# Patient Record
Sex: FEMALE | Race: WHITE | NOT HISPANIC OR LATINO | Employment: OTHER | URBAN - METROPOLITAN AREA
[De-identification: names, ages, dates, MRNs, and addresses within clinical notes are randomized per-mention and may not be internally consistent; named-entity substitution may affect disease eponyms.]

---

## 2017-01-16 ENCOUNTER — DOCUMENTATION (OUTPATIENT)
Dept: RADIATION ONCOLOGY | Facility: HOSPITAL | Age: 82
End: 2017-01-16

## 2017-01-16 ENCOUNTER — TELEPHONE (OUTPATIENT)
Dept: MAMMOGRAPHY | Facility: CLINIC | Age: 82
End: 2017-01-16

## 2017-01-16 NOTE — TELEPHONE ENCOUNTER
Patient's daughter called to tell us she has pneumonia and can't make the appointment today. This is the second time in a week the patient was too sick to come in for her appointment. I offered her a Friday appointment, but she doesn't think she will be better by then. Told the daughter we could reschedule at their convenience but would like to wait until the pt is well enough to come in. She will call us back as soon as she can.     Etta Forrest RN

## 2017-02-08 ENCOUNTER — HOSPITAL ENCOUNTER (OUTPATIENT)
Dept: GENERAL RADIOLOGY | Facility: HOSPITAL | Age: 82
Discharge: HOME | End: 2017-02-08
Attending: INTERNAL MEDICINE | Admitting: INTERNAL MEDICINE

## 2017-02-08 DIAGNOSIS — J47.9 BRONCHIECTASIS WITHOUT ACUTE EXACERBATION (HCC): ICD-10-CM

## 2017-02-08 PROCEDURE — 71020 HC CHEST PA AND LATERAL: CPT

## 2017-02-15 ENCOUNTER — APPOINTMENT (OUTPATIENT)
Dept: LAB | Facility: HOSPITAL | Age: 82
End: 2017-02-15

## 2017-02-21 ENCOUNTER — OFFICE VISIT (OUTPATIENT)
Dept: MAMMOGRAPHY | Facility: CLINIC | Age: 82
End: 2017-02-21

## 2017-02-21 VITALS
DIASTOLIC BLOOD PRESSURE: 52 MMHG | WEIGHT: 100.4 LBS | TEMPERATURE: 97.5 F | OXYGEN SATURATION: 92 % | SYSTOLIC BLOOD PRESSURE: 102 MMHG | HEART RATE: 87 BPM | BODY MASS INDEX: 17.79 KG/M2

## 2017-02-21 DIAGNOSIS — C50.912 BREAST CANCER METASTASIZED TO AXILLARY LYMPH NODE, LEFT (HCC): Primary | ICD-10-CM

## 2017-02-21 DIAGNOSIS — C77.3 BREAST CANCER METASTASIZED TO AXILLARY LYMPH NODE, LEFT (HCC): Primary | ICD-10-CM

## 2017-02-21 PROCEDURE — 99204 OFFICE O/P NEW MOD 45 MIN: CPT | Performed by: SURGERY

## 2017-02-21 NOTE — PROGRESS NOTES
Chief Complaint: Ana France is a 85 y.o.. female here today for Breast Cancer (left)        History of Present Illness:  Patient presents with A left axillary mass which represents breast cancer metastatic to a lymph node..  In November 2015 the patient had a CT scan of the chest that revealed a left axillary mass measuring 3.7 x 2.7 cm.  There was also the suggestion of left supraclavicular lymphadenopathy.  A biopsy of the left axillary mass revealed metastatic cancer consistent with breast origin.  It was ER positive at 5-10%, OK negative, and HER-2 positive.  A PET CT was performed in March 2016 and it revealed the left axillary mass to have increased in size to 4.6 cm.  There were also additional small metabolically active lymph nodes in the axilla, the left hilum, and the subcarinal level.  There was also an indeterminate focus at the left lung apex.  Treatment with an aromatase inhibitor and Herceptin was recommended that the family opted to do nothing.  In July 2016 they return to the oncologist because the area of concern had significantly enlarged.  Again they declined systemic therapy but opted for radiation.  This was performed and there was fairly significant shrinkage of the tumor.  A CT scan of the chest abdomen and pelvis at this time showed fairly stable disease.  Unfortunately the tumor has begun to grow again and she is sent here today to talk about the possibility of surgical resection.  There has been no arm swelling.  The tumor is uncomfortable to the patient but there is no bleeding or odor.      Review of Systems:  Review of Systems   All other systems reviewed and are negative.       Past Medical and Surgical History:  Breast Biopsy History:  Patient has had the following breast biopsies:Left Breast 2000- Malignant  Breast Cancer HIstory:  Patient has the following past medical history of breast cancer treatment:Breast cancer involving a large left axillary mass, ER low 5-10%, OK  negative, HER2 3+. Had lumpectomy with radiation in 2000 and more radiation on Axilla mass 2016  Breast Operations, and year:  Left Lumpectomy 2000 in [facility]     History   Smoking Status   • Never Smoker   Smokeless Tobacco   • Never Used     Obstetric History:  Patient is postmenopausal, entered menopause naturally at age: 44   Number of pregnancies:3  Number of live births: 3  Number of abortions or miscarriages: 0  Age of delivery of first child: 30  Patient breast fed, for the following lenth of time: approx 18 months  Length of time taking birth control pills:12 years  Patient has never taken hormone replacement    Past Surgical History   Procedure Laterality Date   • Breast lumpectomy Right 2000       Past Medical History   Diagnosis Date   • Abdominal pain    • Annual physical exam    • Aortic valve calcification    • Axillary adenopathy    • Benign paroxysmal positional vertigo    • Breast CA, right    • Bronchiectasis without acute exacerbation    • Cancer    • Chronic bronchitis    • COPD with acute exacerbation    • Depression    • Diarrhea    • Dysphagia, oropharyngeal    • Fatigue    • Nasal congestion    • Nausea    • Pleural effusion    • Pneumonia    • Preventive measure    • Systolic murmur    • Urinary frequency    • Weight loss        Prior Hospitalizations, other than for surgery or childbirth, and year:  Pneumonia 2016    Social History:  Patient is .  Patient has 3 daughters.    Family History:  Family History   Problem Relation Age of Onset   • Breast cancer Mother    • Hearing loss Mother    • Coronary artery disease Father    • Brain cancer Brother    • Melanoma Daughter        Vital Signs:  Vitals:    02/21/17 1048   BP: 102/52   Pulse: 87   Temp: 97.5 °F (36.4 °C)   SpO2: 92%       Medications:    Current Outpatient Prescriptions:   •  Prenatal Vit-Min-FA-Fish Oil ([employer] PRENATAL GUMMY PO), Take 2 tablets by mouth daily., Disp: , Rfl:      Physical Examination:  General  Appearance:   Patient is in no distress.  She is well kept and has an thin build.   Psychiatric:  Patient with appropriate mood and affect. Alert and oriented to self, time, and place.    Breast, RIGHT:  small sized, symmetric with the contralateral side.  Breast skin is without erythema, edema, rashes.  There are no visible abnormalities upon inspection during the arm-raising maneuver or with hands on hips in the sitting position. There is no nipple retraction, discharge or nipple/areolar skin changes.There are no masses palpable in the sitting or supine positions.    Breast, LEFT:  small sized, symmetric with the contralateral side.  Breast skin is without erythema, edema, rashes.  There are no visible abnormalities upon inspection during the arm-raising maneuver or with hands on hips in the sitting position. There is no nipple retraction, discharge or nipple/areolar skin changes.There are no masses palpable in the sitting or supine positions.  There is a 3 cm axillary mass that is protruding from the skin but also has a deeper component that does not seem necessarily fixed to the deeper tissues but is rather high in the axilla.  Lymphatic:  There is no axillary, cervical, infraclavicular, or supraclavicular adenopathy bilaterally.  Eyes:  Pupils are round and reactive to light.  Cardiovascular:  Heart rate and rhythm are regular.  Respiratory:  Lungs are clear bilaterally with no crackles or wheezes in any lung field.  Gastrointestinal:  Abdomen is soft, nondistended, and nontender.  There is no evidence of hepatosplenomegaly There are no scars from previous surgery.    Musculoskeletal:  Good strength in all 4 extremities.   There is good range of motion in both shoulders.    Skin:  No new skin lesions or rashes on the skin excluding the breast (see breast exam above).    Assessment:  Diagnoses and all orders for this visit:    Breast cancer metastasized to axillary lymph node, left      Plan:  I am concerned  about the resectability of this area.  It would appear to be rather close to the axillary artery and vein.  Radiation also causes significant fibrosis and may make identification of these important structures difficult at the time of surgery.  I told the patient and her daughter that unless we felt that we could completely excise this area it would not make sense to operate.  I would like to review her scans with the radiologist but it may involve repeating either a PET scan or CT to determine where the axillary vessels are in relationship to this mass.  I will also be speaking with Dr. Latham to see if perhaps we could consider anti-estrogen therapy at this point in time.      CPT coding:    Next Appointment:  No Follow-up on file.

## 2017-02-23 ENCOUNTER — TELEPHONE (OUTPATIENT)
Dept: MAMMOGRAPHY | Facility: CLINIC | Age: 82
End: 2017-02-23

## 2017-02-23 NOTE — TELEPHONE ENCOUNTER
I told her daughters that upon review of the x-rays the tumor appears to be very close to the axillary vessels and I would be uncomfortable operating in that area especially after radiation.  I have spoken with Dr. Latham and relayed this information to him.  The patient will return to Dr. Latham to discuss further options.

## 2017-03-02 ENCOUNTER — TELEPHONE (OUTPATIENT)
Dept: ONCOLOGY | Facility: HOSPITAL | Age: 82
End: 2017-03-02

## 2017-03-02 ENCOUNTER — APPOINTMENT (OUTPATIENT)
Dept: LAB | Facility: HOSPITAL | Age: 82
End: 2017-03-02

## 2017-03-02 ENCOUNTER — OFFICE VISIT (OUTPATIENT)
Dept: ONCOLOGY | Facility: CLINIC | Age: 82
End: 2017-03-02

## 2017-03-02 VITALS
DIASTOLIC BLOOD PRESSURE: 78 MMHG | TEMPERATURE: 97.8 F | HEART RATE: 113 BPM | RESPIRATION RATE: 14 BRPM | SYSTOLIC BLOOD PRESSURE: 122 MMHG | BODY MASS INDEX: 17.61 KG/M2 | WEIGHT: 99.4 LBS | OXYGEN SATURATION: 95 % | HEIGHT: 63 IN

## 2017-03-02 DIAGNOSIS — C77.3 BREAST CANCER METASTASIZED TO AXILLARY LYMPH NODE, LEFT (HCC): Primary | ICD-10-CM

## 2017-03-02 DIAGNOSIS — C50.912 BREAST CANCER METASTASIZED TO AXILLARY LYMPH NODE, LEFT (HCC): Primary | ICD-10-CM

## 2017-03-02 DIAGNOSIS — C50.312 CANCER OF LOWER-INNER QUADRANT OF LEFT FEMALE BREAST (HCC): ICD-10-CM

## 2017-03-02 DIAGNOSIS — Z79.899 DRUG THERAPY: ICD-10-CM

## 2017-03-02 PROCEDURE — G0463 HOSPITAL OUTPT CLINIC VISIT: HCPCS | Performed by: INTERNAL MEDICINE

## 2017-03-02 PROCEDURE — 99214 OFFICE O/P EST MOD 30 MIN: CPT | Performed by: INTERNAL MEDICINE

## 2017-03-02 NOTE — PROGRESS NOTES
REASONS FOR FOLLOW-UP:  Breast cancer involving a large left axillary mass, ER low 5-10%, ME negative, HER2 3+; PET scan shows probable involvement of a right supraclavicular, left hilar, and subcarinal lymph nodes.  She is s/p palliative radiation to the left axillary mass to 4000 cGY completed 8/11/2016.    HISTORY OF PRESENT ILLNESS:     History of Present Illness   This 84-year-old patient was seen initially 3/15/16 at the request of her primary care physician, Dr. Barksdale. She noted a left axillary lymph node mass approximately 6 months before her consultation here and over the past 6 months, the mass has been slowly enlarging currently to the size of a baseball. She has noted no other lymphadenopathy or masses no changes on her self left breast exam. The mass is mildly tender but not overtly painful. She complains of some mild fatigue and a 10 pound weight loss over the past one year. She has chronic dyspnea related to bronchiectasis, however denies any recent worsening of her respiratory status.       She underwent a CT scan of the chest at [facility] on 11/6/15 which showed a mass in the left axilla measuring 3.7 x 2.77 m with central necrosis. There were 2 smaller nodes on a prior imaging study of 2014 measuring 1.8 cm. There is suggestion of left supraclavicular lymph node measuring 1.4 severe. No axillary mass or right axillary lymphadenopathy noted. There are stable shotty lymph nodes in the mediastinum and the lungs have extensive bronchiectasis within the right middle lobe and lingula with chronic inflammatory changes. She had an ultrasound guided biopsy of the left axillary mass performed on 11/23/15 positive for metastatic carcinoma consistent with breast origin. The tumor was ER low +5-10% of cells, ME completely negative, and HER-2/[place] 3+ by immunohistochemistry    A PET scan 3/23/16 showed significant metabolic activity in the left axillary mass and also the left hilum, subcarinal, and  right supraclavicular lymph nodes.  I recommended treatment with Herceptin combined with anti-estrogen therapy as anti-estrogen therapy alone would be expected to have low activity since ER was only lowly positive.  After much discussion, the patient and her family decided that they did not want any active therapy at that time and chose palliative maneuvers.    The patient returned early July 2016 with 2 daughters complaining of the enlarging left axillary mass which is beginning to become painful and developing some bluish discoloration of the skin.  She missed her appointment in April.  She has chronic dyspnea but no cough.  She denies pain elsewhere, weight loss, or other concerns.  They again declined systemic therapy for breast cancer including Herceptin.  She was referred to radiation oncology for consideration of palliative radiation to the enlarging left axillary mass which was completed to 4000 cGy 8/11/16.     She returned 12/21/16 accompanied by 2 daughters for review.  She continues to do quite well with no weight loss, no significant pain or other disease related issues other than the fact that the left axillary mass has again begun to enlarge modestly.  This is not causing her pain and there is no open wound or drainage.  We had her reviewed by Dr. Gutierrez to see if the axillary mass could be potentially resected but he felt it was too near the axillary vessels and nerves to be safely excised.      She returns today continuing to do well with slow enlargement of the left axillary mass but no significant pain, discomfort, weight loss etc.  She has chronic shortness of breath which is stable        Past Medical History, Past Surgical History, Social History, Family History have been reviewed and are without significant changes except as mentioned in my consult 3/15/16.    Review of Systems   Constitutional: Positive for fatigue. Negative for activity change and unexpected weight change.   Respiratory:  "Positive for shortness of breath. Negative for cough and wheezing.    Cardiovascular: Negative for chest pain and palpitations.   Gastrointestinal: Positive for diarrhea. Negative for abdominal distention, abdominal pain, constipation, nausea and vomiting.   Genitourinary: Negative for hematuria.   Musculoskeletal: Negative for arthralgias and back pain.   Skin: Negative for pallor.   Neurological: Negative for weakness, light-headedness, numbness and headaches.   Hematological: Positive for adenopathy.   Psychiatric/Behavioral:        Poor memory      A comprehensive 14 point review of systems was performed and was negative except as mentioned.  Complains of mild cough and poor memory.    Medications:  The current medication list was reviewed in the EMR    ALLERGIES:  No Known Allergies    Objective      Vitals:    03/02/17 1256   BP: 122/78   Pulse: 113   Resp: 14   Temp: 97.8 °F (36.6 °C)   TempSrc: Oral   SpO2: 95%   Weight: 99 lb 6.4 oz (45.1 kg)   Height: 62.99\" (160 cm)   PainSc: 0-No pain     Current Status 3/2/2017   ECOG score 0       Physical Exam     GEN:  Thin elderly woman, NAD, pursed-lip breathing   RESP:  Diminished BS, no wheezing  NECK: Supple with good range of motion; no thyromegaly or masses, no JVD.   LYMPHATICS: There continues to be a 3-4 cm left axillary mass protruding with more fullness in the soft tissue deep to the mass than previous    RECENT LABS:  Hematology WBC   Date Value Ref Range Status   12/21/2016 5.82 4.00 - 10.00 10*3/mm3 Final   08/29/2015 7.33 4.50 - 10.70 K/Cumm Final     RBC   Date Value Ref Range Status   12/21/2016 4.52 3.90 - 5.00 10*6/mm3 Final   08/29/2015 4.67 3.90 - 5.20 Million Final     HEMOGLOBIN   Date Value Ref Range Status   12/21/2016 14.0 11.5 - 14.9 g/dL Final   08/29/2015 13.8 11.9 - 15.5 g/dL Final     HEMATOCRIT   Date Value Ref Range Status   12/21/2016 40.0 34.0 - 45.0 % Final   08/29/2015 40.3 35.6 - 45.5 % Final     PLATELETS   Date Value Ref Range " Status   12/21/2016 214 150 - 375 10*3/mm3 Final   08/29/2015 190 140 - 500 K/Cumm Final        Lab Results   Component Value Date    GLUCOSE 140 (H) 07/21/2016    BUN 11 07/21/2016    CREATININE 0.83 07/21/2016    EGFRIFNONA 65 07/21/2016    BCR 13.3 07/21/2016    CO2 29.2 (H) 07/21/2016    CALCIUM 9.0 07/21/2016    ALBUMIN 3.90 07/21/2016    LABIL2 1.4 07/21/2016    AST 14 07/21/2016    ALT 9 07/21/2016     Ca 15-3 19.2        Assessment/Plan   This very pleasant 84-year-old woman returns for follow-up of her breast cancer involving a  left axillary mass with PET scan showing probable metastatic disease in addition to a right supraclavicular lymph node, left hilar lymph node, and a subcarinal lymph node.  Her disease is estrogen receptor positive but to a low degree and only 5-10% of cells and HER-2/[place] overexpressed.    She is status post radiation therapy to the large axillary mass 4000 cGy completed 8/11/16.      Despite initial response in the left axillary mass, the mass has again begun to increase in size over the past 2-3 months, albeit at a slow rate.  She saw Dr. Gutierrez who did not feel the mass was resectable as it is close to the axillary vessels and nerves.  Dr. Garnica indicated further radiation could be given, but she had a fairly short benefit after the last round of radiation.    I again discussed with the patient and daughters potential of systemic therapy with Herceptin given that the tumor had HER-2/[place] overexpression.  At this time they would like to pursue treatment with Herceptin every 3 weeks.  I discussed the possible side effects of infusion reactions, cardiomyopathy etc.  She was agreeable to proceed understanding treatment is palliative.  We will arrange a follow-up baseline PET scan, 2-D echocardiogram and submit Herceptin for prior authorization.  I will see the patient back in 2 weeks to review scans and potentially proceed with the first dose of Herceptin.  We could consider  adding an aromatase inhibitor but she has fairly low estrogen receptor so degree of benefit would likely be small.               3/2/2017      CC:

## 2017-03-02 NOTE — TELEPHONE ENCOUNTER
Called pt's daughter at the request of Dr. Latham.  Left message stating that the Herceptin met the guidelines for coverage that Medicare follows.  I left my direct line if she has questions.

## 2017-03-09 ENCOUNTER — HOSPITAL ENCOUNTER (OUTPATIENT)
Dept: CARDIOLOGY | Facility: HOSPITAL | Age: 82
Discharge: HOME | End: 2017-03-09
Attending: INTERNAL MEDICINE

## 2017-03-09 ENCOUNTER — HOSPITAL ENCOUNTER (OUTPATIENT)
Dept: PET IMAGING | Facility: HOSPITAL | Age: 82
Discharge: HOME | End: 2017-03-09
Attending: INTERNAL MEDICINE

## 2017-03-09 ENCOUNTER — HOSPITAL ENCOUNTER (OUTPATIENT)
Dept: PET IMAGING | Facility: HOSPITAL | Age: 82
Discharge: HOME | End: 2017-03-09
Attending: INTERNAL MEDICINE | Admitting: INTERNAL MEDICINE

## 2017-03-09 VITALS
HEART RATE: 105 BPM | SYSTOLIC BLOOD PRESSURE: 136 MMHG | BODY MASS INDEX: 17.54 KG/M2 | HEIGHT: 63 IN | DIASTOLIC BLOOD PRESSURE: 90 MMHG | WEIGHT: 99 LBS

## 2017-03-09 DIAGNOSIS — C50.312 CANCER OF LOWER-INNER QUADRANT OF LEFT FEMALE BREAST (HCC): ICD-10-CM

## 2017-03-09 DIAGNOSIS — C77.3 BREAST CANCER METASTASIZED TO AXILLARY LYMPH NODE, LEFT (HCC): ICD-10-CM

## 2017-03-09 DIAGNOSIS — Z79.899 DRUG THERAPY: ICD-10-CM

## 2017-03-09 DIAGNOSIS — C50.912 BREAST CANCER METASTASIZED TO AXILLARY LYMPH NODE, LEFT (HCC): ICD-10-CM

## 2017-03-09 LAB
ASCENDING AORTA: 3.4 CM
BH CV ECHO MEAS - ACS: 1.5 CM
BH CV ECHO MEAS - AO ROOT AREA (BSA CORRECTED): 1.8
BH CV ECHO MEAS - AO ROOT AREA: 5.2 CM^2
BH CV ECHO MEAS - AO ROOT DIAM: 2.6 CM
BH CV ECHO MEAS - BSA(HAYCOCK): 1.4 M^2
BH CV ECHO MEAS - BSA: 1.4 M^2
BH CV ECHO MEAS - BZI_BMI: 17.7 KILOGRAMS/M^2
BH CV ECHO MEAS - BZI_METRIC_HEIGHT: 160 CM
BH CV ECHO MEAS - BZI_METRIC_WEIGHT: 45.4 KG
BH CV ECHO MEAS - EDV(CUBED): 26.5 ML
BH CV ECHO MEAS - EDV(TEICH): 34.5 ML
BH CV ECHO MEAS - EF(CUBED): 85.5 %
BH CV ECHO MEAS - EF(TEICH): 80.3 %
BH CV ECHO MEAS - ESV(CUBED): 3.8 ML
BH CV ECHO MEAS - ESV(TEICH): 6.8 ML
BH CV ECHO MEAS - FS: 47.4 %
BH CV ECHO MEAS - IVS/LVPW: 1
BH CV ECHO MEAS - IVSD: 0.98 CM
BH CV ECHO MEAS - LV MASS(C)D: 77.4 GRAMS
BH CV ECHO MEAS - LV MASS(C)DI: 53.8 GRAMS/M^2
BH CV ECHO MEAS - LVIDD: 3 CM
BH CV ECHO MEAS - LVIDS: 1.6 CM
BH CV ECHO MEAS - LVOT AREA (M): 2.5 CM^2
BH CV ECHO MEAS - LVOT AREA: 2.5 CM^2
BH CV ECHO MEAS - LVOT DIAM: 1.8 CM
BH CV ECHO MEAS - LVPWD: 0.95 CM
BH CV ECHO MEAS - MV A DUR: 0.11 SEC
BH CV ECHO MEAS - MV A MAX VEL: 84.9 CM/SEC
BH CV ECHO MEAS - MV DEC SLOPE: 295 CM/SEC^2
BH CV ECHO MEAS - MV DEC TIME: 0.19 SEC
BH CV ECHO MEAS - MV E MAX VEL: 56.8 CM/SEC
BH CV ECHO MEAS - MV E/A: 0.67
BH CV ECHO MEAS - MV MAX PG: 3.6 MMHG
BH CV ECHO MEAS - MV MEAN PG: 2 MMHG
BH CV ECHO MEAS - MV P1/2T MAX VEL: 59.7 CM/SEC
BH CV ECHO MEAS - MV P1/2T: 59.3 MSEC
BH CV ECHO MEAS - MV V2 MAX: 95 CM/SEC
BH CV ECHO MEAS - MV V2 MEAN: 67.8 CM/SEC
BH CV ECHO MEAS - MV V2 VTI: 16 CM
BH CV ECHO MEAS - MVA P1/2T LCG: 3.7 CM^2
BH CV ECHO MEAS - MVA(P1/2T): 3.7 CM^2
BH CV ECHO MEAS - PA MAX PG (FULL): 2.1 MMHG
BH CV ECHO MEAS - PA MAX PG: 3.7 MMHG
BH CV ECHO MEAS - PA V2 MAX: 96 CM/SEC
BH CV ECHO MEAS - PVA(V,A): 1.6 CM^2
BH CV ECHO MEAS - PVA(V,D): 1.6 CM^2
BH CV ECHO MEAS - RV MAX PG: 1.6 MMHG
BH CV ECHO MEAS - RV MEAN PG: 0.61 MMHG
BH CV ECHO MEAS - RV V1 MAX: 63.5 CM/SEC
BH CV ECHO MEAS - RV V1 MEAN: 33.8 CM/SEC
BH CV ECHO MEAS - RV V1 VTI: 7.3 CM
BH CV ECHO MEAS - RVOT AREA: 2.5 CM^2
BH CV ECHO MEAS - RVOT DIAM: 1.8 CM
BH CV ECHO MEAS - SI(CUBED): 15.7 ML/M^2
BH CV ECHO MEAS - SI(TEICH): 19.2 ML/M^2
BH CV ECHO MEAS - SUP REN AO DIAM: 1.5 CM
BH CV ECHO MEAS - SV(CUBED): 22.6 ML
BH CV ECHO MEAS - SV(RVOT): 17.9 ML
BH CV ECHO MEAS - SV(TEICH): 27.7 ML
SINUS: 2.9 CM
STJ: 3.1 CM

## 2017-03-09 PROCEDURE — 0 FLUDEOXYGLUCOSE F18 SOLUTION: Performed by: INTERNAL MEDICINE

## 2017-03-09 PROCEDURE — 93306 TTE W/DOPPLER COMPLETE: CPT

## 2017-03-09 PROCEDURE — A9552 F18 FDG: HCPCS | Performed by: INTERNAL MEDICINE

## 2017-03-09 PROCEDURE — 78815 PET IMAGE W/CT SKULL-THIGH: CPT

## 2017-03-09 PROCEDURE — 93306 TTE W/DOPPLER COMPLETE: CPT | Performed by: INTERNAL MEDICINE

## 2017-03-09 RX ADMIN — FLUDEOXYGLUCOSE F18 1 DOSE: 300 INJECTION INTRAVENOUS at 11:47

## 2017-03-17 ENCOUNTER — INFUSION (OUTPATIENT)
Dept: ONCOLOGY | Facility: HOSPITAL | Age: 82
End: 2017-03-17

## 2017-03-17 ENCOUNTER — LAB (OUTPATIENT)
Dept: LAB | Facility: HOSPITAL | Age: 82
End: 2017-03-17

## 2017-03-17 ENCOUNTER — OFFICE VISIT (OUTPATIENT)
Dept: ONCOLOGY | Facility: CLINIC | Age: 82
End: 2017-03-17

## 2017-03-17 VITALS
BODY MASS INDEX: 17.82 KG/M2 | DIASTOLIC BLOOD PRESSURE: 72 MMHG | WEIGHT: 100.6 LBS | RESPIRATION RATE: 16 BRPM | OXYGEN SATURATION: 92 % | HEART RATE: 106 BPM | TEMPERATURE: 98 F | HEIGHT: 63 IN | SYSTOLIC BLOOD PRESSURE: 120 MMHG

## 2017-03-17 VITALS — HEART RATE: 88 BPM | DIASTOLIC BLOOD PRESSURE: 80 MMHG | SYSTOLIC BLOOD PRESSURE: 147 MMHG

## 2017-03-17 DIAGNOSIS — C77.3 BREAST CANCER METASTASIZED TO AXILLARY LYMPH NODE, LEFT (HCC): ICD-10-CM

## 2017-03-17 DIAGNOSIS — C50.912 BREAST CANCER METASTASIZED TO AXILLARY LYMPH NODE, LEFT (HCC): Primary | ICD-10-CM

## 2017-03-17 DIAGNOSIS — C50.919 BREAST CANCER METASTASIZED TO AXILLARY LYMPH NODE, UNSPECIFIED LATERALITY (HCC): Primary | ICD-10-CM

## 2017-03-17 DIAGNOSIS — C77.3 BREAST CANCER METASTASIZED TO AXILLARY LYMPH NODE, UNSPECIFIED LATERALITY (HCC): Primary | ICD-10-CM

## 2017-03-17 DIAGNOSIS — C50.912 BREAST CANCER METASTASIZED TO AXILLARY LYMPH NODE, LEFT (HCC): ICD-10-CM

## 2017-03-17 DIAGNOSIS — C50.312 CANCER OF LOWER-INNER QUADRANT OF LEFT FEMALE BREAST (HCC): ICD-10-CM

## 2017-03-17 DIAGNOSIS — C77.3 BREAST CANCER METASTASIZED TO AXILLARY LYMPH NODE, LEFT (HCC): Primary | ICD-10-CM

## 2017-03-17 LAB
ALBUMIN SERPL-MCNC: 4 G/DL (ref 3.5–5.2)
ALBUMIN/GLOB SERPL: 1.3 G/DL (ref 1.1–2.4)
ALP SERPL-CCNC: 76 U/L (ref 38–116)
ALT SERPL W P-5'-P-CCNC: 11 U/L (ref 0–33)
ANION GAP SERPL CALCULATED.3IONS-SCNC: 10.7 MMOL/L
AST SERPL-CCNC: 16 U/L (ref 0–32)
BASOPHILS # BLD AUTO: 0.04 10*3/MM3 (ref 0–0.1)
BASOPHILS NFR BLD AUTO: 0.5 % (ref 0–1.1)
BILIRUB SERPL-MCNC: 0.8 MG/DL (ref 0.1–1.2)
BUN BLD-MCNC: 9 MG/DL (ref 6–20)
BUN/CREAT SERPL: 13.6 (ref 7.3–30)
CALCIUM SPEC-SCNC: 9.1 MG/DL (ref 8.5–10.2)
CHLORIDE SERPL-SCNC: 91 MMOL/L (ref 98–107)
CO2 SERPL-SCNC: 29.3 MMOL/L (ref 22–29)
CREAT BLD-MCNC: 0.66 MG/DL (ref 0.6–1.1)
DEPRECATED RDW RBC AUTO: 47.9 FL (ref 37–49)
EOSINOPHIL # BLD AUTO: 0.07 10*3/MM3 (ref 0–0.36)
EOSINOPHIL NFR BLD AUTO: 0.9 % (ref 1–5)
ERYTHROCYTE [DISTWIDTH] IN BLOOD BY AUTOMATED COUNT: 14.5 % (ref 11.7–14.5)
GFR SERPL CREATININE-BSD FRML MDRD: 85 ML/MIN/1.73
GLOBULIN UR ELPH-MCNC: 3 GM/DL (ref 1.8–3.5)
GLUCOSE BLD-MCNC: 196 MG/DL (ref 74–124)
HCT VFR BLD AUTO: 42.7 % (ref 34–45)
HGB BLD-MCNC: 14.1 G/DL (ref 11.5–14.9)
HOLD SPECIMEN: NORMAL
IMM GRANULOCYTES # BLD: 0.03 10*3/MM3 (ref 0–0.03)
IMM GRANULOCYTES NFR BLD: 0.4 % (ref 0–0.5)
LYMPHOCYTES # BLD AUTO: 0.87 10*3/MM3 (ref 1–3.5)
LYMPHOCYTES NFR BLD AUTO: 11.5 % (ref 20–49)
MCH RBC QN AUTO: 29.9 PG (ref 27–33)
MCHC RBC AUTO-ENTMCNC: 33 G/DL (ref 32–35)
MCV RBC AUTO: 90.7 FL (ref 83–97)
MONOCYTES # BLD AUTO: 0.35 10*3/MM3 (ref 0.25–0.8)
MONOCYTES NFR BLD AUTO: 4.6 % (ref 4–12)
NEUTROPHILS # BLD AUTO: 6.19 10*3/MM3 (ref 1.5–7)
NEUTROPHILS NFR BLD AUTO: 82.1 % (ref 39–75)
NRBC BLD MANUAL-RTO: 0 /100 WBC (ref 0–0)
PLATELET # BLD AUTO: 271 10*3/MM3 (ref 150–375)
PMV BLD AUTO: 9.2 FL (ref 8.9–12.1)
POTASSIUM BLD-SCNC: 4.5 MMOL/L (ref 3.5–4.7)
PROT SERPL-MCNC: 7 G/DL (ref 6.3–8)
RBC # BLD AUTO: 4.71 10*6/MM3 (ref 3.9–5)
SODIUM BLD-SCNC: 131 MMOL/L (ref 134–145)
WBC NRBC COR # BLD: 7.55 10*3/MM3 (ref 4–10)

## 2017-03-17 PROCEDURE — 96375 TX/PRO/DX INJ NEW DRUG ADDON: CPT | Performed by: INTERNAL MEDICINE

## 2017-03-17 PROCEDURE — 85025 COMPLETE CBC W/AUTO DIFF WBC: CPT | Performed by: INTERNAL MEDICINE

## 2017-03-17 PROCEDURE — 25010000002 TRASTUZUMAB PER 10 MG: Performed by: INTERNAL MEDICINE

## 2017-03-17 PROCEDURE — 99214 OFFICE O/P EST MOD 30 MIN: CPT | Performed by: INTERNAL MEDICINE

## 2017-03-17 PROCEDURE — 25010000002 DIPHENHYDRAMINE PER 50 MG: Performed by: INTERNAL MEDICINE

## 2017-03-17 PROCEDURE — 80053 COMPREHEN METABOLIC PANEL: CPT | Performed by: INTERNAL MEDICINE

## 2017-03-17 PROCEDURE — 36415 COLL VENOUS BLD VENIPUNCTURE: CPT | Performed by: INTERNAL MEDICINE

## 2017-03-17 PROCEDURE — 96413 CHEMO IV INFUSION 1 HR: CPT | Performed by: INTERNAL MEDICINE

## 2017-03-17 RX ORDER — SODIUM CHLORIDE 9 MG/ML
250 INJECTION, SOLUTION INTRAVENOUS ONCE
Status: COMPLETED | OUTPATIENT
Start: 2017-03-17 | End: 2017-03-17

## 2017-03-17 RX ORDER — FAMOTIDINE 10 MG/ML
20 INJECTION, SOLUTION INTRAVENOUS ONCE
Status: COMPLETED | OUTPATIENT
Start: 2017-03-17 | End: 2017-03-17

## 2017-03-17 RX ADMIN — DIPHENHYDRAMINE HYDROCHLORIDE 25 MG: 50 INJECTION, SOLUTION INTRAMUSCULAR; INTRAVENOUS at 14:10

## 2017-03-17 RX ADMIN — Medication 370 MG: at 14:59

## 2017-03-17 RX ADMIN — SODIUM CHLORIDE 250 ML: 900 INJECTION, SOLUTION INTRAVENOUS at 14:05

## 2017-03-17 RX ADMIN — FAMOTIDINE 20 MG: 10 INJECTION, SOLUTION INTRAVENOUS at 14:06

## 2017-03-17 NOTE — PROGRESS NOTES
REASONS FOR FOLLOW-UP:  Breast cancer involving a large left axillary mass, ER low 5-10%, WI negative, HER2 3+; PET scan shows probable involvement of a right supraclavicular, left hilar, and subcarinal lymph nodes.  She is s/p palliative radiation to the left axillary mass to 4000 cGY completed 8/11/2016.    HISTORY OF PRESENT ILLNESS:     History of Present Illness   This 84-year-old patient was seen initially 3/15/16 at the request of her primary care physician, Dr. Barksdale. She noted a left axillary lymph node mass approximately 6 months before her consultation here and over the past 6 months, the mass has been slowly enlarging currently to the size of a baseball. She has noted no other lymphadenopathy or masses no changes on her self left breast exam. The mass is mildly tender but not overtly painful. She complains of some mild fatigue and a 10 pound weight loss over the past one year. She has chronic dyspnea related to bronchiectasis, however denies any recent worsening of her respiratory status.       She underwent a CT scan of the chest at [facility] on 11/6/15 which showed a mass in the left axilla measuring 3.7 x 2.77 m with central necrosis. There were 2 smaller nodes on a prior imaging study of 2014 measuring 1.8 cm. There is suggestion of left supraclavicular lymph node measuring 1.4 severe. No axillary mass or right axillary lymphadenopathy noted. There are stable shotty lymph nodes in the mediastinum and the lungs have extensive bronchiectasis within the right middle lobe and lingula with chronic inflammatory changes. She had an ultrasound guided biopsy of the left axillary mass performed on 11/23/15 positive for metastatic carcinoma consistent with breast origin. The tumor was ER low +5-10% of cells, WI completely negative, and HER-2/[place] 3+ by immunohistochemistry    A PET scan 3/23/16 showed significant metabolic activity in the left axillary mass and also the left hilum, subcarinal, and  right supraclavicular lymph nodes.  I recommended treatment with Herceptin combined with anti-estrogen therapy as anti-estrogen therapy alone would be expected to have low activity since ER was only lowly positive.  After much discussion, the patient and her family decided that they did not want any active therapy at that time and chose palliative maneuvers.    The patient returned early July 2016 with 2 daughters complaining of the enlarging left axillary mass which is beginning to become painful and developing some bluish discoloration of the skin.  She missed her appointment in April.  She has chronic dyspnea but no cough.  She denies pain elsewhere, weight loss, or other concerns.  They again declined systemic therapy for breast cancer including Herceptin.  She was referred to radiation oncology for consideration of palliative radiation to the enlarging left axillary mass which was completed to 4000 cGy 8/11/16.     She returned 12/21/16 accompanied by 2 daughters for review.  She continues to do quite well with no weight loss, no significant pain or other disease related issues other than the fact that the left axillary mass has again begun to enlarge modestly.  This is not causing her pain and there is no open wound or drainage.  We had her reviewed by Dr. Gutierrez to see if the axillary mass could be potentially resected but he felt it was too near the axillary vessels and nerves to be safely excised.      She returns today continuing to do well with slow enlargement of the left axillary mass but no significant pain, discomfort, weight loss etc.  She has chronic shortness of breath which is stable.  A PET scan was reviewed which shows decreased size and metabolic activity of the left axillary mass but still with an SUV of 16.  There are progressive metabolic lesions at the right supraclavicular station, mediastinum and hilum.        Past Medical History, Past Surgical History, Social History, Family History  "have been reviewed and are without significant changes except as mentioned in my consult 3/15/16.    Review of Systems   Constitutional: Positive for fatigue. Negative for activity change and unexpected weight change.   Respiratory: Positive for shortness of breath. Negative for cough and wheezing.    Cardiovascular: Negative for chest pain and palpitations.   Gastrointestinal: Positive for diarrhea. Negative for abdominal distention, abdominal pain, constipation, nausea and vomiting.   Genitourinary: Negative for hematuria.   Musculoskeletal: Negative for arthralgias and back pain.   Skin: Negative for pallor.   Neurological: Negative for weakness, light-headedness, numbness and headaches.   Hematological: Positive for adenopathy.   Psychiatric/Behavioral:        Poor memory      A comprehensive 14 point review of systems was performed and was negative except as mentioned.  Complains of mild cough and poor memory.    Medications:  The current medication list was reviewed in the EMR    ALLERGIES:  No Known Allergies    Objective      Vitals:    03/17/17 1244   BP: 120/72   Pulse: 106   Resp: 16   Temp: 98 °F (36.7 °C)   SpO2: 92%   Weight: 100 lb 9.6 oz (45.6 kg)   Height: 62.99\" (160 cm)   PainSc: 0-No pain     Current Status 3/17/2017   ECOG score 0       Physical Exam     GEN:  Thin elderly woman, NAD, pursed-lip breathing   RESP:  Diminished BS, no wheezing  NECK: Supple with good range of motion; no thyromegaly or masses, no JVD.   LYMPHATICS: There continues to be a 3-4 cm left axillary mass protruding with more fullness in the soft tissue deep to the mass than previous    RECENT LABS:  Hematology WBC   Date Value Ref Range Status   03/17/2017 7.55 4.00 - 10.00 10*3/mm3 Final   08/29/2015 7.33 4.50 - 10.70 K/Cumm Final     RBC   Date Value Ref Range Status   03/17/2017 4.71 3.90 - 5.00 10*6/mm3 Final   08/29/2015 4.67 3.90 - 5.20 Million Final     HEMOGLOBIN   Date Value Ref Range Status   03/17/2017 14.1 11.5 " - 14.9 g/dL Final   08/29/2015 13.8 11.9 - 15.5 g/dL Final     HEMATOCRIT   Date Value Ref Range Status   03/17/2017 42.7 34.0 - 45.0 % Final   08/29/2015 40.3 35.6 - 45.5 % Final     PLATELETS   Date Value Ref Range Status   03/17/2017 271 150 - 375 10*3/mm3 Final   08/29/2015 190 140 - 500 K/Cumm Final        Lab Results   Component Value Date    GLUCOSE 140 (H) 07/21/2016    BUN 11 07/21/2016    CREATININE 0.83 07/21/2016    EGFRIFNONA 65 07/21/2016    BCR 13.3 07/21/2016    CO2 29.2 (H) 07/21/2016    CALCIUM 9.0 07/21/2016    ALBUMIN 3.90 07/21/2016    LABIL2 1.4 07/21/2016    AST 14 07/21/2016    ALT 9 07/21/2016     Ca 15-3 19.2        Assessment/Plan   This very pleasant 84-year-old woman returns for follow-up of her breast cancer involving a  left axillary mass with PET scan showing probable metastatic disease in addition to a right supraclavicular lymph node, left hilar lymph node, and a subcarinal lymph node.  Her disease is estrogen receptor positive but to a low degree and only 5-10% of cells and HER-2/[place] overexpressed.    She is status post radiation therapy to the large axillary mass 4000 cGy completed 8/11/16.      Despite initial response in the left axillary mass, the mass has again begun to increase in size over the past 2-3 months, albeit at a slow rate.  She saw Dr. Gutierrez who did not feel the mass was resectable as it is close to the axillary vessels and nerves.  Dr. Garnica indicated further radiation could be given, but she had a fairly short benefit after the last round of radiation.    PET scan reviewed today shows decreased but continued metabolic activity involving the left axillary mass issue be 16.0.  There are progressive metabolic lesions at the right supraclavicular station, mediastinum and hilum.  We plan to initiate Herceptin every 3 weeks with goal of palliation and slowing tumor growth.  Echocardiogram was reviewed with a normal ejection fraction 60-65% and we will plan to  repeat an echocardiogram in 3 months.  I discussed with the patient and daughter risk of infusion reactions and cardiomyopathy from Herceptin.  They agreed to proceed.    We will proceed with Herceptin today.  She will see a nurse practitioner in 3 weeks for follow-up and Herceptin and M.D. visit in 6 weeks.  We will measure her axillary mass each visit.               3/17/2017      CC:

## 2017-03-17 NOTE — PROGRESS NOTES
1600  Pt c/o nausea and feeling cold, face flushed  Herceptin stopped  NS infusing  176/105-102    1605  Dr Latham at chairside  Order noted to hold Herceptin today.  Order noted for pt to return in 3 weeks for eval  1607  157/97 - 95  Pt states shes feeling queasy .  NS infusing    1626 146/79, Pt reports feeling better, nausea improved. Dr. Latham visited again and said ok for pt to leave. Pt's dgt notified.   1642 Pt left awake and alert via w/c accompanied by pt's dgt.

## 2017-04-04 ENCOUNTER — TELEPHONE (OUTPATIENT)
Dept: ONCOLOGY | Facility: CLINIC | Age: 82
End: 2017-04-04

## 2017-04-04 NOTE — TELEPHONE ENCOUNTER
----- Message from Maikel Goldman sent at 4/4/2017  3:51 PM EDT -----  Contact: DAUGHTER SOLIS BERNARD CALL IN REGARDS TO HOSPICE AND HER MEDS.      Daughter calling asking if she should have herceptin again on Friday since she had a reaction to it.  They will see NP on Friday and can discuss with her at that time.  Daughter ok with that.  Didn't realize they were going to see someone.

## 2017-04-07 ENCOUNTER — LAB (OUTPATIENT)
Dept: LAB | Facility: HOSPITAL | Age: 82
End: 2017-04-07

## 2017-04-07 ENCOUNTER — OFFICE VISIT (OUTPATIENT)
Dept: ONCOLOGY | Facility: CLINIC | Age: 82
End: 2017-04-07

## 2017-04-07 ENCOUNTER — APPOINTMENT (OUTPATIENT)
Dept: ONCOLOGY | Facility: HOSPITAL | Age: 82
End: 2017-04-07

## 2017-04-07 VITALS
BODY MASS INDEX: 17.82 KG/M2 | OXYGEN SATURATION: 99 % | HEIGHT: 63 IN | HEART RATE: 86 BPM | SYSTOLIC BLOOD PRESSURE: 122 MMHG | WEIGHT: 100.6 LBS | RESPIRATION RATE: 12 BRPM | DIASTOLIC BLOOD PRESSURE: 74 MMHG | TEMPERATURE: 97.5 F

## 2017-04-07 DIAGNOSIS — C50.919 BREAST CANCER METASTASIZED TO AXILLARY LYMPH NODE, UNSPECIFIED LATERALITY (HCC): Primary | ICD-10-CM

## 2017-04-07 DIAGNOSIS — C77.3 BREAST CANCER METASTASIZED TO AXILLARY LYMPH NODE, LEFT (HCC): ICD-10-CM

## 2017-04-07 DIAGNOSIS — C50.912 BREAST CANCER METASTASIZED TO AXILLARY LYMPH NODE, LEFT (HCC): ICD-10-CM

## 2017-04-07 DIAGNOSIS — C77.3 BREAST CANCER METASTASIZED TO AXILLARY LYMPH NODE, UNSPECIFIED LATERALITY (HCC): Primary | ICD-10-CM

## 2017-04-07 LAB
BASOPHILS # BLD AUTO: 0.03 10*3/MM3 (ref 0–0.1)
BASOPHILS NFR BLD AUTO: 0.4 % (ref 0–1.1)
DEPRECATED RDW RBC AUTO: 45.9 FL (ref 37–49)
EOSINOPHIL # BLD AUTO: 0.13 10*3/MM3 (ref 0–0.36)
EOSINOPHIL NFR BLD AUTO: 1.8 % (ref 1–5)
ERYTHROCYTE [DISTWIDTH] IN BLOOD BY AUTOMATED COUNT: 14.3 % (ref 11.7–14.5)
HCT VFR BLD AUTO: 43.6 % (ref 34–45)
HGB BLD-MCNC: 14.5 G/DL (ref 11.5–14.9)
IMM GRANULOCYTES # BLD: 0.01 10*3/MM3 (ref 0–0.03)
IMM GRANULOCYTES NFR BLD: 0.1 % (ref 0–0.5)
LYMPHOCYTES # BLD AUTO: 1.31 10*3/MM3 (ref 1–3.5)
LYMPHOCYTES NFR BLD AUTO: 18.2 % (ref 20–49)
MCH RBC QN AUTO: 29.3 PG (ref 27–33)
MCHC RBC AUTO-ENTMCNC: 33.3 G/DL (ref 32–35)
MCV RBC AUTO: 88.1 FL (ref 83–97)
MONOCYTES # BLD AUTO: 0.4 10*3/MM3 (ref 0.25–0.8)
MONOCYTES NFR BLD AUTO: 5.6 % (ref 4–12)
NEUTROPHILS # BLD AUTO: 5.32 10*3/MM3 (ref 1.5–7)
NEUTROPHILS NFR BLD AUTO: 73.9 % (ref 39–75)
NRBC BLD MANUAL-RTO: 0 /100 WBC (ref 0–0)
PLATELET # BLD AUTO: 198 10*3/MM3 (ref 150–375)
PMV BLD AUTO: 9.7 FL (ref 8.9–12.1)
RBC # BLD AUTO: 4.95 10*6/MM3 (ref 3.9–5)
WBC NRBC COR # BLD: 7.2 10*3/MM3 (ref 4–10)

## 2017-04-07 PROCEDURE — 85025 COMPLETE CBC W/AUTO DIFF WBC: CPT | Performed by: NURSE PRACTITIONER

## 2017-04-07 PROCEDURE — 36416 COLLJ CAPILLARY BLOOD SPEC: CPT | Performed by: NURSE PRACTITIONER

## 2017-04-07 PROCEDURE — 99214 OFFICE O/P EST MOD 30 MIN: CPT | Performed by: NURSE PRACTITIONER

## 2017-04-07 NOTE — PROGRESS NOTES
REASONS FOR FOLLOW-UP:  Breast cancer involving a large left axillary mass, ER low 5-10%, PA negative, HER2 3+; PET scan shows probable involvement of a right supraclavicular, left hilar, and subcarinal lymph nodes.  She is s/p palliative radiation to the left axillary mass to 4000 cGY completed 8/11/2016.    HISTORY OF PRESENT ILLNESS:     History of Present Illness      The patient is an 85-year-old female with the above-mentioned history, who presents today to discuss further treatment option.  He was previously recommended by Dr. Latham to proceed with Herceptin.  She initiated treatment 3/17/2017.  Shortly after the infusion began, the patient began feeling cold with flushed face.  The infusion was stopped.  Additionally, she felt nauseated.  No additional medication was given.  The patient did not complete her Herceptin infusion that day.     Today, 3 weeks later, the patient is scheduled to proceed with Herceptin.  Upon review with the patient's daughter, they declined further treatment.  The patient's struggles with significant dementia.  She does forget about her diagnosis, therefore becomes very upset in the infusion center being frequently reminded.  The patient reports she continues to feel well.  She does not recall the episode of questionable infusion reaction in the office.  She reports her energy and appetite are stable.  She denies any new pain.    Past Medical History, Past Surgical History, Social History, Family History have been reviewed and are without significant changes except as mentioned in my consult 3/15/16.    Review of Systems   Constitutional: Positive for fatigue. Negative for activity change and unexpected weight change.   Respiratory: Negative for cough, shortness of breath and wheezing.    Cardiovascular: Negative for chest pain and palpitations.   Gastrointestinal: Negative for abdominal distention, abdominal pain, constipation, diarrhea, nausea and vomiting.   Genitourinary:  "Negative for hematuria.   Musculoskeletal: Negative for arthralgias and back pain.   Skin: Negative for pallor.   Neurological: Negative for weakness, light-headedness, numbness and headaches.   Hematological: Positive for adenopathy.   Psychiatric/Behavioral:        Poor memory      A comprehensive 14 point review of systems was performed and was negative except as mentioned.  Complains of mild cough and poor memory.    Medications:  The current medication list was reviewed in the EMR    ALLERGIES:  No Known Allergies    Objective      Vitals:    04/07/17 1320   BP: 122/74   Pulse: 86   Resp: 12   Temp: 97.5 °F (36.4 °C)   TempSrc: Oral   SpO2: 99%   Weight: 100 lb 9.6 oz (45.6 kg)   Height: 62.99\" (160 cm)   PainSc: 0-No pain     Current Status 4/7/2017   ECOG score 0       Physical Exam     GEN:  Thin elderly woman, NAD, pursed-lip breathing   RESP:  Diminished BS, no wheezing  NECK: Supple with good range of motion; no thyromegaly or masses, no JVD.   LYMPHATICS: There continues to be a 3-4 cm left axillary mass protruding with more fullness in the soft tissue deep to the mass than previous (mass not examined today 4/7/2017)    RECENT LABS:  Lab Results   Component Value Date    WBC 7.20 04/07/2017    HGB 14.5 04/07/2017    HCT 43.6 04/07/2017    MCV 88.1 04/07/2017     04/07/2017       Assessment/Plan   This very pleasant 84-year-old woman returns for follow-up of her breast cancer involving a  left axillary mass with PET scan showing probable metastatic disease in addition to a right supraclavicular lymph node, left hilar lymph node, and a subcarinal lymph node.  Her disease is estrogen receptor positive but to a low degree and only 5-10% of cells and HER-2/[place] overexpressed.    She is status post radiation therapy to the large axillary mass 4000 cGy completed 8/11/16.      Despite initial response in the left axillary mass, the mass has again begun to increase in size over the past 2-3 months, albeit " at a slow rate.  She saw Dr. Gutierrez who did not feel the mass was resectable as it is close to the axillary vessels and nerves.  Dr. Garnica indicated further radiation could be given, but she had a fairly short benefit after the last round of radiation.    PET scan reviewed today shows decreased but continued metabolic activity involving the left axillary mass issue be 16.0.  There are progressive metabolic lesions at the right supraclavicular station, mediastinum and hilum.  We planed to initiate Herceptin every 3 weeks with goal of palliation and slowing tumor growth.  This was initiated 3/17/2017. ?  Mild infusion reaction with flushing and nausea, the patient opted not to complete her first infusion.      Returning today 4/72017, initial plans to premedicate with additional Benadryl dexamethasone and attempt Herceptin.  The patient, who is quite demented, and her daughter refuse further treatment.  The patient's daughter would like to further discuss with Dr. Latham.  The patient becomes quite confused when in our office, and often forgets of her diagnosis.  According to the patient's daughter, the length of treatment time, and infusion center creates much stress for the patient.  They would like to further discuss with Dr. Latham, though understand given the patient's age, and performance status, they're limited treatment options.  They will return in 2 weeks for follow-up with Dr. Latham to discuss options.    Total of 25 minutes spent with the patient and her daughter, 20 minutes spent in face-to-face counseling and education of the plan, and decision not to proceed with further treatment.    Cathy Aleoj, APRN  4/7/2017      CC:

## 2017-04-20 ENCOUNTER — APPOINTMENT (OUTPATIENT)
Dept: LAB | Facility: HOSPITAL | Age: 82
End: 2017-04-20

## 2017-04-20 ENCOUNTER — APPOINTMENT (OUTPATIENT)
Dept: ONCOLOGY | Facility: CLINIC | Age: 82
End: 2017-04-20

## 2017-04-21 ENCOUNTER — OFFICE VISIT (OUTPATIENT)
Dept: ONCOLOGY | Facility: CLINIC | Age: 82
End: 2017-04-21

## 2017-04-21 ENCOUNTER — APPOINTMENT (OUTPATIENT)
Dept: LAB | Facility: HOSPITAL | Age: 82
End: 2017-04-21

## 2017-04-21 VITALS
WEIGHT: 102 LBS | BODY MASS INDEX: 18.07 KG/M2 | HEIGHT: 63 IN | TEMPERATURE: 97.6 F | OXYGEN SATURATION: 94 % | DIASTOLIC BLOOD PRESSURE: 86 MMHG | SYSTOLIC BLOOD PRESSURE: 122 MMHG | HEART RATE: 97 BPM | RESPIRATION RATE: 16 BRPM

## 2017-04-21 DIAGNOSIS — C50.919 BREAST CANCER METASTASIZED TO AXILLARY LYMPH NODE, UNSPECIFIED LATERALITY (HCC): Primary | ICD-10-CM

## 2017-04-21 DIAGNOSIS — C77.3 BREAST CANCER METASTASIZED TO AXILLARY LYMPH NODE, UNSPECIFIED LATERALITY (HCC): Primary | ICD-10-CM

## 2017-04-21 PROCEDURE — G0463 HOSPITAL OUTPT CLINIC VISIT: HCPCS | Performed by: INTERNAL MEDICINE

## 2017-04-21 PROCEDURE — 99213 OFFICE O/P EST LOW 20 MIN: CPT | Performed by: INTERNAL MEDICINE

## 2017-04-21 NOTE — PROGRESS NOTES
REASONS FOR FOLLOW-UP:  Breast cancer involving a large left axillary mass, ER low 5-10%, NE negative, HER2 3+; PET scan shows probable involvement of a right supraclavicular, left hilar, and subcarinal lymph nodes.  She is s/p palliative radiation to the left axillary mass to 4000 cGY completed 8/11/2016.  We attempted a dose of Herceptin, but she had an infusion reaction and no further treatment is planned    HISTORY OF PRESENT ILLNESS:     History of Present Illness   This 84-year-old patient was seen initially 3/15/16 at the request of her primary care physician, Dr. Barksdale. She noted a left axillary lymph node mass approximately 6 months before her consultation here and over the past 6 months, the mass has been slowly enlarging currently to the size of a baseball. She has noted no other lymphadenopathy or masses no changes on her self left breast exam. The mass is mildly tender but not overtly painful. She complains of some mild fatigue and a 10 pound weight loss over the past one year. She has chronic dyspnea related to bronchiectasis, however denies any recent worsening of her respiratory status.       She underwent a CT scan of the chest at [facility] on 11/6/15 which showed a mass in the left axilla measuring 3.7 x 2.77 m with central necrosis. There were 2 smaller nodes on a prior imaging study of 2014 measuring 1.8 cm. There is suggestion of left supraclavicular lymph node measuring 1.4 severe. No axillary mass or right axillary lymphadenopathy noted. There are stable shotty lymph nodes in the mediastinum and the lungs have extensive bronchiectasis within the right middle lobe and lingula with chronic inflammatory changes. She had an ultrasound guided biopsy of the left axillary mass performed on 11/23/15 positive for metastatic carcinoma consistent with breast origin. The tumor was ER low +5-10% of cells, NE completely negative, and HER-2/[place] 3+ by immunohistochemistry    A PET scan 3/23/16  showed significant metabolic activity in the left axillary mass and also the left hilum, subcarinal, and right supraclavicular lymph nodes.  I recommended treatment with Herceptin combined with anti-estrogen therapy as anti-estrogen therapy alone would be expected to have low activity since ER was only lowly positive.  After much discussion, the patient and her family decided that they did not want any active therapy at that time and chose palliative maneuvers.    The patient returned early July 2016 with 2 daughters complaining of the enlarging left axillary mass which is beginning to become painful and developing some bluish discoloration of the skin.  She missed her appointment in April.  She has chronic dyspnea but no cough.  She denies pain elsewhere, weight loss, or other concerns.  They again declined systemic therapy for breast cancer including Herceptin.  She was referred to radiation oncology for consideration of palliative radiation to the enlarging left axillary mass which was completed to 4000 cGy 8/11/16.     She returned 12/21/16 accompanied by 2 daughters for review.  She continues to do quite well with no weight loss, no significant pain or other disease related issues other than the fact that the left axillary mass has again begun to enlarge modestly.  This is not causing her pain and there is no open wound or drainage.  We had her reviewed by Dr. Gutierrez to see if the axillary mass could be potentially resected but he felt it was too near the axillary vessels and nerves to be safely excised.      She returns today continuing to do well with slow enlargement of the left axillary mass but no significant pain, discomfort, weight loss etc.  She has chronic shortness of breath which is stable.  A PET scan was reviewed which shows decreased size and metabolic activity of the left axillary mass but still with an SUV of 16.  There are progressive metabolic lesions at the right supraclavicular station,  "mediastinum and hilum.    We attempted to give a dose of Herceptin on 3/17/17 but the patient had a moderate infusion reaction with flushing, elevated blood pressure, and nausea.  The family has decided no further treatment at this time.  The patient has poor memory and doesn't remember receiving Herceptin or the reaction.    Past Medical History, Past Surgical History, Social History, Family History have been reviewed and are without significant changes except as mentioned in my consult 3/15/16.    Review of Systems   Constitutional: Positive for fatigue. Negative for activity change and unexpected weight change.   Respiratory: Positive for shortness of breath. Negative for cough and wheezing.    Cardiovascular: Negative for chest pain and palpitations.   Gastrointestinal: Positive for diarrhea. Negative for abdominal distention, abdominal pain, constipation, nausea and vomiting.   Genitourinary: Negative for hematuria.   Musculoskeletal: Negative for arthralgias and back pain.   Skin: Negative for pallor.   Neurological: Negative for weakness, light-headedness, numbness and headaches.   Hematological: Positive for adenopathy.   Psychiatric/Behavioral:        Poor memory      A comprehensive 14 point review of systems was performed and was negative except as mentioned.  Complains of mild cough and poor memory.    Medications:  The current medication list was reviewed in the EMR    ALLERGIES:  No Known Allergies    Objective      Vitals:    04/21/17 1633   BP: 122/86   Pulse: 97   Resp: 16   Temp: 97.6 °F (36.4 °C)   TempSrc: Oral   SpO2: 94%   Weight: 102 lb (46.3 kg)   Height: 62.99\" (160 cm)   PainSc: 2  Comment: knot under LT arm pit     Current Status 4/21/2017   ECOG score 0       Physical Exam     GEN:  Thin elderly woman, NAD, pursed-lip breathing   RESP:  Diminished BS, no wheezing  NECK: Supple with good range of motion; no thyromegaly or masses, no JVD.   LYMPHATICS: There continues to be a 3-4 cm left " axillary mass protruding with more fullness in the soft tissue deep to the mass than previous.  The mass is reddish purple but no open areas or drainage     RECENT LABS:  Hematology WBC   Date Value Ref Range Status   04/07/2017 7.20 4.00 - 10.00 10*3/mm3 Final     RBC   Date Value Ref Range Status   04/07/2017 4.95 3.90 - 5.00 10*6/mm3 Final     Hemoglobin   Date Value Ref Range Status   04/07/2017 14.5 11.5 - 14.9 g/dL Final     Hematocrit   Date Value Ref Range Status   04/07/2017 43.6 34.0 - 45.0 % Final     Platelets   Date Value Ref Range Status   04/07/2017 198 150 - 375 10*3/mm3 Final        Lab Results   Component Value Date    GLUCOSE 196 (H) 03/17/2017    BUN 9 03/17/2017    CREATININE 0.66 03/17/2017    EGFRIFNONA 85 03/17/2017    BCR 13.6 03/17/2017    CO2 29.3 (H) 03/17/2017    CALCIUM 9.1 03/17/2017    ALBUMIN 4.00 03/17/2017    LABIL2 1.3 03/17/2017    AST 16 03/17/2017    ALT 11 03/17/2017     Ca 15-3 19.2        Assessment/Plan   This very pleasant 84-year-old woman returns for follow-up of her breast cancer involving a  left axillary mass with PET scan showing probable metastatic disease in addition to a right supraclavicular lymph node, left hilar lymph node, and a subcarinal lymph node.  Her disease is estrogen receptor positive but to a low degree and only 5-10% of cells and HER-2/[place] overexpressed.    She is status post radiation therapy to the large axillary mass 4000 cGy completed 8/11/16.      Despite initial response in the left axillary mass, the mass has again begun to increase in size over the past 2-3 months, albeit at a slow rate.  She saw Dr. Gutierrez who did not feel the mass was resectable as it is close to the axillary vessels and nerves.  Dr. Garnica indicated further radiation could be given, but she had a fairly short benefit after the last round of radiation.    PET scan reviewed today shows decreased but continued metabolic activity involving the left axillary mass issue be  16.0.  There are progressive metabolic lesions at the right supraclavicular station, mediastinum and hilum.      We attempted a dose of Herceptin last month but she had an infusion reaction and the family has at this time decided on no further systemic treatment for the metastatic breast cancer.  This is of course perfectly reasonable given her age and rather advanced dementia.  I do not think she would be a good candidate for oral HER-2 therapy or chemotherapy.  The daughter plans to contact hospice.  They understand that she is at risk for wound problems associated with the left axillary mass; if that occurs I think hospice should be able to help manage and provide wound care for the patient etc.    They're more than welcome to call in the future if questions arise or I can be of service to them.             4/21/2017      CC:

## 2017-04-28 ENCOUNTER — APPOINTMENT (OUTPATIENT)
Dept: LAB | Facility: HOSPITAL | Age: 82
End: 2017-04-28

## 2017-04-28 ENCOUNTER — APPOINTMENT (OUTPATIENT)
Dept: ONCOLOGY | Facility: HOSPITAL | Age: 82
End: 2017-04-28

## 2017-07-14 ENCOUNTER — TELEPHONE (OUTPATIENT)
Dept: ONCOLOGY | Facility: HOSPITAL | Age: 82
End: 2017-07-14

## 2017-07-14 NOTE — TELEPHONE ENCOUNTER
Patient's daughter, Alisa calling to see if she could talk to Dr Latham about patient's progression. [facility] is already involved and will probably have to help with dressing changes soon since her would is starting to develop. They just want to know more about what to expect and how fast this could progress and Dr Latham's opinion on it. Told her I would leave this info for Dr Latham and see if he has time to call them Monday. She v/u

## 2017-10-30 ENCOUNTER — TELEPHONE (OUTPATIENT)
Dept: ONCOLOGY | Facility: HOSPITAL | Age: 82
End: 2017-10-30

## 2017-10-30 NOTE — TELEPHONE ENCOUNTER
Dgt called and wanted more information on pt status.  Explained to dgt that pt was with [facility] and they would be able to give more information on pt status since [facility] is caring for pt.  Dgt stated that pt is in an assisted living facility and when [facility] RN sees pt that she and her sisters are not able to see RN.  Dgt was also wanting for pt to obtain a scan to see if the cancer has progressed.  Explained to dgt that since pt is in [facility] that this is not covered.  Dgt was no agreeable to that and still wanted pt to have scan. Reviewed above information with Dr Latham and instructed to contact [facility] and for them to contact family.  Called and spoke with RN at [facility] and they will contact family.  [facility] is also has attending MD for pt.